# Patient Record
Sex: FEMALE | Race: WHITE | RURAL
[De-identification: names, ages, dates, MRNs, and addresses within clinical notes are randomized per-mention and may not be internally consistent; named-entity substitution may affect disease eponyms.]

---

## 2017-04-06 ENCOUNTER — TELEPHONE (OUTPATIENT)
Dept: FAMILY MEDICINE CLINIC | Age: 27
End: 2017-04-06

## 2017-04-06 NOTE — TELEPHONE ENCOUNTER
Patient was called and notified of Janice's response and was told that if things get really bad then maybe she can be seen in another office or go to ER.

## 2017-04-06 NOTE — TELEPHONE ENCOUNTER
SW patient and she stated that she was hoping to get in today. I told her that we have had some phone and computers issues today and was sorry of just getting back with her at 3 pm.    She has sensitive ears and has seen an ENT about 5 months ago for it. She has caught a cold sense then and has been having pain in both of her ears for about 2 weeks and  feel like this could be more of an ear infection. She has not taken anything, but has to go to work shortly and will call back in the morning to see if she can be worked in or advised. Message was sent to [facility] for review for any further advice.

## 2017-06-13 ENCOUNTER — OFFICE VISIT (OUTPATIENT)
Dept: INTERNAL MEDICINE CLINIC | Age: 27
End: 2017-06-13

## 2017-06-13 VITALS
OXYGEN SATURATION: 100 % | HEIGHT: 69 IN | BODY MASS INDEX: 24.73 KG/M2 | SYSTOLIC BLOOD PRESSURE: 139 MMHG | WEIGHT: 167 LBS | RESPIRATION RATE: 16 BRPM | HEART RATE: 101 BPM | TEMPERATURE: 98 F | DIASTOLIC BLOOD PRESSURE: 89 MMHG

## 2017-06-13 DIAGNOSIS — R63.5 WEIGHT GAIN: ICD-10-CM

## 2017-06-13 DIAGNOSIS — F39 MOOD DISORDER (HCC): Primary | ICD-10-CM

## 2017-06-13 NOTE — MR AVS SNAPSHOT
Visit Information Date & Time Provider Department Dept. Phone Encounter #  
 6/13/2017  8:15 AM Melanie Cifuentes MD [facility] Amendtala Jacobs [id number] Follow-up Instructions Return in about 3 months (around 9/13/2017) for routine follow up. Upcoming Health Maintenance Date Due DTaP/Tdap/Td series (1 - Tdap) 9/20/2013 PAP AKA CERVICAL CYTOLOGY 7/30/2016 INFLUENZA AGE 9 TO ADULT 8/1/2017 Allergies as of 6/13/2017  Review Complete On: 6/13/2017 By: Bridgette Galvan LPN Severity Noted Reaction Type Reactions Depakote [Divalproex]  06/13/2017    Swelling Current Immunizations  Never Reviewed Name Date HPV 10/20/2011, 6/30/2011, 5/2/2011 Not reviewed this visit You Were Diagnosed With   
  
 Codes Comments Mood disorder ([facility]    -  Primary ICD-10-CM: F39 
ICD-9-CM: 296.90 Weight gain     ICD-10-CM: R63.5 ICD-9-CM: 783.1 Vitals BP Pulse Temp Resp Height(growth percentile) Weight(growth percentile) 139/89 (BP 1 Location: Left arm, BP Patient Position: Sitting) (!) 101 98 °F (36.7 °C) (Oral) 16 5' 9\" (1.753 m) 167 lb (75.8 kg) LMP SpO2 BMI OB Status Smoking Status 05/13/2017 100% 24.66 kg/m2 Having regular periods Never Smoker BMI and BSA Data Body Mass Index Body Surface Area  
 24.66 kg/m 2 1.92 m 2 Preferred Pharmacy Pharmacy Name Phone [facility] [address] [place] [address], [address] [place] [address] Venkata Jacobs 101 Your Updated Medication List  
  
   
This list is accurate as of: 6/13/17  9:40 AM.  Always use your most recent med list.  
  
  
  
  
 norgestimate-ethinyl estradiol 0.25-35 mg-mcg Tab Commonly known as:  Nubia [place] Take 1 Tab by mouth daily. valACYclovir 1 gram tablet Commonly known as:  VALTREX Take 1 Tab by mouth daily. We Performed the Following CBC W/O DIFF [93764 CPT(R)] METABOLIC PANEL, COMPREHENSIVE [17350 CPT(R)] MS COLLECTION VENOUS BLOOD,VENIPUNCTURE [id number] CPT(R)] MS HANDLG&/OR CONVEY OF SPEC FOR TR OFFICE TO LAB [36557 CPT(R)] TSH 3RD GENERATION [65258 CPT(R)] Follow-up Instructions Return in about 3 months (around 9/13/2017) for routine follow up. Patient Instructions We discussed stratagies to reduce weight. Specifically discussed the Intemmitant Fasting diet, the [address] [place] [address] Avenue. Discussed weight loss programs as well as exercise, although emphasized caloric restriction. Consider keeping a food diary and seeing what you eat in a day and looking for where you can cut calories. Try taking a picture of everything you eat for a day. Phone apps can help witrh weight loss. Consider 'Lose It' You must reduce liquid calories like soda and juices. Weight Watchers or other weight loss programs can be very helpful. There is no \"magic pill', but there some newly FDA approved medications for obesity. Introducing [facility] SERVICES! Ant Payne introduces IBUonline patient portal. Now you can access parts of your medical record, email your doctor's office, and request medication refills online. 1. In your internet browser, go to [email or web address] 2. Click on the First Time User? Click Here link in the Sign In box. You will see the New Member Sign Up page. 3. Enter your IBUonline Access Code exactly as it appears below. You will not need to use this code after youve completed the sign-up process. If you do not sign up before the expiration date, you must request a new code. · IBUonline Access Code: [email or web address] Expires: 9/11/2017  8:23 AM 
 
4. Enter the last four digits of your Social Security Number (xxxx) and Date of Birth (mm/dd/yyyy) as indicated and click Submit. You will be taken to the next sign-up page. 5. Create a IBUonline ID.  This will be your IBUonline login ID and cannot be changed, so think of one that is secure and easy to remember. 6. Create a Schvey password. You can change your password at any time. 7. Enter your Password Reset Question and Answer. This can be used at a later time if you forget your password. 8. Enter your e-mail address. You will receive e-mail notification when new information is available in [address] 9. Click Sign Up. You can now view and download portions of your medical record. 10. Click the Download Summary menu link to download a portable copy of your medical information. If you have questions, please visit the Frequently Asked Questions section of the Schvey website. Remember, Schvey is NOT to be used for urgent needs. For medical emergencies, dial 911. Now available from your iPhone and Android! Please provide this summary of care documentation to your next provider. Your primary care clinician is listed as Ankur Ordoñez. If you have any questions after today's visit, please call [phone].

## 2017-06-13 NOTE — PATIENT INSTRUCTIONS
We discussed stratagies to reduce weight. Specifically discussed the Intemmitant Fasting diet, the [address]. Discussed weight loss programs as well as exercise, although emphasized caloric restriction. Consider keeping a food diary and seeing what you eat in a day and looking for where you can cut calories. Try taking a picture of everything you eat for a day. Phone apps can help witrh weight loss. Consider 'Lose It'  You must reduce liquid calories like soda and juices. Weight Watchers or other weight loss programs can be very helpful. There is no \"magic pill', but there some newly FDA approved medications for obesity.

## 2017-06-13 NOTE — PROGRESS NOTES
Chief Complaint   Patient presents with   [address]     new patient to this practice     I have reviewed the patient's medical history in detail and updated the computerized patient record. Health Maintenance reviewed. Do you have an [facility] [address] in place in the event that you have a healthcare crisis that could impact your decision making as it pertains to your health?no    Would you like information about the [facility] given. no

## 2017-06-13 NOTE — PROGRESS NOTES
PROGRESS NOTE        SUBJECTIVE:  Diagnosis/Chief Complaint: Establish Care (new patient to this practice)      Doing well with mood no, up and down, sleep mostly OK, has had ayesha? Doesn't seem severe, she mentions Adderall as a treatment option. Symptoms seeing therapist who Dx her with bipolar?, not simple depression  Suicidal: no  Side affects: yes - from depakote which was stopped x 2 mo, weight gain vision changes breast enlargement, suicidal thoughts  States taking medications per medicine list.no, stopped depakote, it was not helping. Patient Active Problem List    Diagnosis Date Noted    Mood disorder ([facility] 05/20/2016    Insomnia 09/22/2014     Current Outpatient Prescriptions   Medication Sig Dispense Refill    valACYclovir (VALTREX) 1 gram tablet Take 1 Tab by mouth daily. 90 Tab 1    norgestimate-ethinyl estradiol (ORTHO-CYCLEN) 0.25-35 mg-mcg per tablet Take 1 Tab by mouth daily.        Allergies   Allergen Reactions    Depakote [Divalproex] Swelling     Past Medical History:   Diagnosis Date    Depression     GERD (gastroesophageal reflux disease)     Headache     Thyroid disease      Past Surgical History:   Procedure Laterality Date    HX COLONOSCOPY       Family History   Problem Relation Age of Onset    Arrhythmia Mother      TACHYCARDIA    Heart Disease Maternal Grandmother     No Known Problems Father     No Known Problems Maternal Grandfather     Stroke Paternal Grandmother     Hypertension Paternal Grandmother     Stroke Paternal Grandfather     Hypertension Paternal Grandfather     Diabetes Neg Hx     Cancer Neg Hx      Social History   Substance Use Topics    Smoking status: Never Smoker    Smokeless tobacco: Never Used    Alcohol use No        OBJECTIVE:    .  Visit Vitals    /89 (BP 1 Location: Left arm, BP Patient Position: Sitting)    Pulse (!) 101    Temp 98 °F (36.7 °C) (Oral)    Resp 16    Ht 5' 9\" (1.753 m)    Wt 167 lb (75.8 kg)    LMP 05/13/2017    SpO2 100%    BMI 24.66 kg/m2     WDWN in NAD  Heart RRR, no:C/M/R  Lungs CTA No wheezes, rales or rhonchi  Abdo: soft no tenderness, rebound or guarding  Neurological exam[de-identified] 2-12 intact  Psychiatric: Normal mood, judgement    Reviewed: Medications, allergies, clinical lab test results and imaging results have been reviewed. Any abnormal findings have been addressed. ASSESSMENT:       ICD-10-CM ICD-9-CM    1. Mood disorder (HCC) F39 296.90 CBC W/O DIFF      METABOLIC PANEL, COMPREHENSIVE      TSH 3RD GENERATION      MT HANDLG&/OR CONVEY OF SPEC FOR TR OFFICE TO LAB      MT COLLECTION VENOUS BLOOD,VENIPUNCTURE   2. Weight gain R63.5 783.1        PLAN    Orders Placed This Encounter    CBC W/O DIFF    METABOLIC PANEL, COMPREHENSIVE    TSH 3RD GENERATION    MT HANDLG&/OR CONVEY OF SPEC FOR TR OFFICE TO LAB    MT COLLECTION VENOUS BLOOD,VENIPUNCTURE     Not exactly sure what her diagnosis is,? Bipolar, does not seem to be a simple depression. We will get more information and make a decision on what to do. Leave off Depakote. Discussed diet and ways to help her lose weight. Will do some labs to rule out other causes of her symptoms  See patient instructions, went over them personally with the patient. Emphasized compliance. Questions answered. Patient states that they understand the plan of action and will call if there are any issues or misunderstandings. About 25 minutes spent talking about her health issues at today's visit. Follow-up Disposition:  Return in about 3 months (around 9/13/2017) for routine follow up.

## 2017-06-13 NOTE — LETTER
6/19/2017 10:17 AM 
 
[facility] [address] [place] [address] Dear Sara Frausto: 
 
Please find your most recent results below. Resulted Orders CBC W/O DIFF Result Value Ref Range WBC 4.3 3.4 - 10.8 x10E3/uL  
 RBC 4.37 3.77 - 5.28 x10E6/uL HGB 12.6 11.1 - 15.9 g/dL HCT 39.6 34.0 - 46.6 % MCV 91 79 - 97 fL  
 MCH 28.8 26.6 - 33.0 pg  
 MCHC 31.8 31.5 - 35.7 g/dL  
 RDW 14.2 12.3 - 15.4 % PLATELET 672 110 - 871 x10E3/uL Narrative Performed at:  [address]  [id number] : Meir Henning MD, Phone:  [phone] METABOLIC PANEL, COMPREHENSIVE Result Value Ref Range Glucose 79 65 - 99 mg/dL BUN 9 6 - 20 mg/dL Creatinine 0.61 0.57 - 1.00 mg/dL GFR est non- >59 mL/min/1.73 GFR est  >59 mL/min/1.73  
 BUN/Creatinine ratio 15 9 - 23 Sodium 142 134 - 144 mmol/L Potassium 4.4 3.5 - 5.2 mmol/L Chloride 102 96 - 106 mmol/L  
 CO2 22 18 - 29 mmol/L Calcium 8.9 8.7 - 10.2 mg/dL Protein, total 7.4 6.0 - 8.5 g/dL Albumin 4.2 3.5 - 5.5 g/dL GLOBULIN, TOTAL 3.2 1.5 - 4.5 g/dL A-G Ratio 1.3 1.2 - 2.2 Bilirubin, total 0.6 0.0 - 1.2 mg/dL Alk. phosphatase 39 39 - 117 IU/L  
 AST (SGOT) 17 0 - 40 IU/L  
 ALT (SGPT) 14 0 - 32 IU/L Narrative Performed at:  [address]  [id number] : Meir Henning MD, Phone:  [phone] TSH 3RD GENERATION Result Value Ref Range TSH 1.720 0.450 - 4.500 uIU/mL Narrative Performed at:  [address]  [id number] : Meir Henning MD, Phone:  [phone] RECOMMENDATIONS: 
The results of your most recent labs are normal/ stable. Please follow up as scheduled. Please call me if you have any questions: [phone] Sincerely, Megan Domínguez MD

## 2017-06-14 LAB
ALBUMIN SERPL-MCNC: 4.2 G/DL (ref 3.5–5.5)
ALBUMIN/GLOB SERPL: 1.3 {RATIO} (ref 1.2–2.2)
ALP SERPL-CCNC: 39 IU/L (ref 39–117)
ALT SERPL-CCNC: 14 IU/L (ref 0–32)
AST SERPL-CCNC: 17 IU/L (ref 0–40)
BILIRUB SERPL-MCNC: 0.6 MG/DL (ref 0–1.2)
BUN SERPL-MCNC: 9 MG/DL (ref 6–20)
BUN/CREAT SERPL: 15 (ref 9–23)
CALCIUM SERPL-MCNC: 8.9 MG/DL (ref 8.7–10.2)
CHLORIDE SERPL-SCNC: 102 MMOL/L (ref 96–106)
CO2 SERPL-SCNC: 22 MMOL/L (ref 18–29)
CREAT SERPL-MCNC: 0.61 MG/DL (ref 0.57–1)
ERYTHROCYTE [DISTWIDTH] IN BLOOD BY AUTOMATED COUNT: 14.2 % (ref 12.3–15.4)
GLOBULIN SER CALC-MCNC: 3.2 G/DL (ref 1.5–4.5)
GLUCOSE SERPL-MCNC: 79 MG/DL (ref 65–99)
HCT VFR BLD AUTO: 39.6 % (ref 34–46.6)
HGB BLD-MCNC: 12.6 G/DL (ref 11.1–15.9)
MCH RBC QN AUTO: 28.8 PG (ref 26.6–33)
MCHC RBC AUTO-ENTMCNC: 31.8 G/DL (ref 31.5–35.7)
MCV RBC AUTO: 91 FL (ref 79–97)
PLATELET # BLD AUTO: 307 X10E3/UL (ref 150–379)
POTASSIUM SERPL-SCNC: 4.4 MMOL/L (ref 3.5–5.2)
PROT SERPL-MCNC: 7.4 G/DL (ref 6–8.5)
RBC # BLD AUTO: 4.37 X10E6/UL (ref 3.77–5.28)
SODIUM SERPL-SCNC: 142 MMOL/L (ref 134–144)
TSH SERPL DL<=0.005 MIU/L-ACNC: 1.72 UIU/ML (ref 0.45–4.5)
WBC # BLD AUTO: 4.3 X10E3/UL (ref 3.4–10.8)

## 2017-06-15 ENCOUNTER — TELEPHONE (OUTPATIENT)
Dept: INTERNAL MEDICINE CLINIC | Age: 27
End: 2017-06-15

## 2017-06-15 DIAGNOSIS — F34.81 DMDD (DISRUPTIVE MOOD DYSREGULATION DISORDER) (HCC): Primary | ICD-10-CM

## 2017-06-15 NOTE — TELEPHONE ENCOUNTER
Disruptive mood disorder per therapist/psych tegretol caused weight gain, suggested adderal.  We need her to see psych for further eval.  Referral done

## 2017-06-28 ENCOUNTER — TELEPHONE (OUTPATIENT)
Dept: FAMILY MEDICINE CLINIC | Age: 27
End: 2017-06-28

## 2017-06-28 NOTE — TELEPHONE ENCOUNTER
----- Message from Jie Mayen sent at 6/28/2017 10:54 AM EDT -----  Regarding: TINO Clements/Telephone  Pt requested an appt for this wk or next wk for meds renewal.  Best contact number [phone].

## 2017-06-28 NOTE — TELEPHONE ENCOUNTER
Offered patient an appointment but it was taken by [facility]. I told her I'd try to get her an appointment.

## 2017-06-30 ENCOUNTER — OFFICE VISIT (OUTPATIENT)
Dept: FAMILY MEDICINE CLINIC | Age: 27
End: 2017-06-30

## 2017-06-30 VITALS
TEMPERATURE: 98.8 F | HEART RATE: 89 BPM | BODY MASS INDEX: 24.97 KG/M2 | HEIGHT: 69 IN | OXYGEN SATURATION: 99 % | RESPIRATION RATE: 16 BRPM | WEIGHT: 168.6 LBS | SYSTOLIC BLOOD PRESSURE: 120 MMHG | DIASTOLIC BLOOD PRESSURE: 60 MMHG

## 2017-06-30 DIAGNOSIS — F34.81 DMDD (DISRUPTIVE MOOD DYSREGULATION DISORDER) (HCC): Primary | ICD-10-CM

## 2017-06-30 RX ORDER — ESCITALOPRAM OXALATE 5 MG/1
10 TABLET ORAL DAILY
Qty: 30 TAB | Refills: 3 | Status: SHIPPED | OUTPATIENT
Start: 2017-06-30

## 2017-06-30 NOTE — MR AVS SNAPSHOT
Visit Information Date & Time Provider Department Dept. Phone Encounter #  
 6/30/2017 11:00 AM Lilibeth Alvarado NP [address] [phone] [id number] Your Appointments 8/16/2017  9:15 AM  
ACUTE CARE with MD Sanjana Combs [address] [place] [address] [facility]t Note: f/u on mood disorder $0pb $0cp [facility]6/[facility]/17  
 [address] Marita Bills [address]  
[phone]  
  
   
 [address] Upcoming Health Maintenance Date Due DTaP/Tdap/Td series (1 - Tdap) 9/20/2013 PAP AKA CERVICAL CYTOLOGY 7/30/2016 INFLUENZA AGE 9 TO ADULT 8/1/2017 Allergies as of 6/30/2017  Review Complete On: 6/30/2017 By: John Duncan LPN Severity Noted Reaction Type Reactions Depakote [Divalproex]  06/13/2017    Swelling Current Immunizations  Never Reviewed Name Date HPV 10/20/2011, 6/30/2011, 5/2/2011 Not reviewed this visit Vitals BP Pulse Temp Resp Height(growth percentile) Weight(growth percentile) 120/60 89 98.8 °F (37.1 °C) (Oral) 16 5' 9\" (1.753 m) 168 lb 9.6 oz (76.5 kg) LMP SpO2 BMI OB Status Smoking Status 05/13/2017 99% 24.9 kg/m2 Having regular periods Never Smoker BMI and BSA Data Body Mass Index Body Surface Area 24.9 kg/m 2 1.93 m 2 Preferred Pharmacy Pharmacy Name Phone [facility] [address] [facility], [facility] Venkata Jacobs 101 Your Updated Medication List  
  
   
This list is accurate as of: 6/30/17 12:19 PM.  Always use your most recent med list.  
  
  
  
  
 escitalopram oxalate 5 mg tablet Commonly known as:  Vallorie Primmer Take 2 Tabs by mouth daily. Indications: mood disorder  
  
 norgestimate-ethinyl estradiol 0.25-35 mg-mcg Tab Commonly known as:  Cy Conch Take 1 Tab by mouth daily. valACYclovir 1 gram tablet Commonly known as:  VALTREX Take 1 Tab by mouth daily. Prescriptions Sent to Pharmacy Refills  
 escitalopram oxalate (LEXAPRO) 5 mg tablet 3 Sig: Take 2 Tabs by mouth daily. Indications: mood disorder Class: Normal  
 Pharmacy: [id number] Landon Fox  #: [phone] Route: Oral  
  
Introducing [facility] SERVICES! [facility] introduces Motion Displays patient portal. Now you can access parts of your medical record, email your doctor's office, and request medication refills online. 1. In your internet browser, go to [email or web address] 2. Click on the First Time User? Click Here link in the Sign In box. You will see the New Member Sign Up page. 3. Enter your Motion Displays Access Code exactly as it appears below. You will not need to use this code after youve completed the sign-up process. If you do not sign up before the expiration date, you must request a new code. · Motion Displays Access Code: [email or web address] Expires: 9/11/2017  8:23 AM 
 
4. Enter the last four digits of your Social Security Number (xxxx) and Date of Birth (mm/dd/yyyy) as indicated and click Submit. You will be taken to the next sign-up page. 5. Create a Motion Displays ID. This will be your Motion Displays login ID and cannot be changed, so think of one that is secure and easy to remember. 6. Create a Motion Displays password. You can change your password at any time. 7. Enter your Password Reset Question and Answer. This can be used at a later time if you forget your password. 8. Enter your e-mail address. You will receive e-mail notification when new information is available in [address] 9. Click Sign Up. You can now view and download portions of your medical record. 10. Click the Download Summary menu link to download a portable copy of your medical information.  
 
If you have questions, please visit the Frequently Asked Questions section of the MediaSite. Remember, Think Good Thoughtshart is NOT to be used for urgent needs. For medical emergencies, dial 911. Now available from your iPhone and Android! Please provide this summary of care documentation to your next provider. Your primary care clinician is listed as Ann Lozano. If you have any questions after today's visit, please call [phone].

## 2017-06-30 NOTE — TELEPHONE ENCOUNTER
Patient would like to be seen for depression. Her \"mood\" medication was changed by Dr. Rosi Garcia at [address] [facility] but she says she has side effects from it. She wants a different medication.

## 2017-07-01 NOTE — PROGRESS NOTES
Subjective:     Shane Valenzuela is a 27 y.o. female who presents today with the following:  Chief Complaint   Patient presents with    Depression     and forest to be a different Nemiah Back was dx with DMDD. Was on depakote which work extremely well but caused multiple side effect including weight gain and insomnia. On no  medication at this time. Having difficulty managing relationships with boyfriend, family and friends. Would like to try medication and continue counseling. Has been r lexapro in the past but did not  the prescription. Willing to try lexapro . ROS:  Gen: denies fever, chills, fatigue, weight loss, weight gain  HEENT:denies blurry vision, nasal congestion, sore throat  Resp: denies dypsnea, cough, wheezing  CV: denies chest pain radiating to the jaws or arms, palpitations, lower extremity edema  Abd: denies nausea, vomiting, diarrhea, constipation  Neuro: denies numbness/tingling  Endo: denies polyuria, polydipsia, heat/cold intolerance  Heme: no lymphadenopathy    Allergies   Allergen Reactions    Depakote [Divalproex] Swelling         Current Outpatient Prescriptions:     escitalopram oxalate (LEXAPRO) 5 mg tablet, Take 2 Tabs by mouth daily. Indications: mood disorder, Disp: 30 Tab, Rfl: 3    valACYclovir (VALTREX) 1 gram tablet, Take 1 Tab by mouth daily. , Disp: 90 Tab, Rfl: 1    norgestimate-ethinyl estradiol (ORTHO-CYCLEN) 0.25-35 mg-mcg per tablet, Take 1 Tab by mouth daily. , Disp: , Rfl:     Past Medical History:   Diagnosis Date    Depression     GERD (gastroesophageal reflux disease)     Headache     Thyroid disease        Past Surgical History:   Procedure Laterality Date    HX COLONOSCOPY         History   Smoking Status    Never Smoker   Smokeless Tobacco    Never Used       Social History     Social History    Marital status:      Spouse name: N/A    Number of children: 0    Years of education: N/A     Occupational History    cook Social History Main Topics    Smoking status: Never Smoker    Smokeless tobacco: Never Used    Alcohol use No    Drug use: No    Sexual activity: Yes     Other Topics Concern    None     Social History Narrative    Lives with BF       Family History   Problem Relation Age of Onset    Arrhythmia Mother      TACHYCARDIA    Heart Disease Maternal Grandmother     No Known Problems Father     No Known Problems Maternal Grandfather     Stroke Paternal Grandmother     Hypertension Paternal Grandmother     Stroke Paternal Grandfather     Hypertension Paternal Grandfather     Diabetes Neg Hx     Cancer Neg Hx          Objective:     Visit Vitals    /60    Pulse 89    Temp 98.8 °F (37.1 °C) (Oral)    Resp 16    Ht 5' 9\" (1.753 m)    Wt 168 lb 9.6 oz (76.5 kg)    LMP 05/13/2017    SpO2 99%    BMI 24.9 kg/m2     Body mass index is 24.9 kg/(m^2). General: Alert and oriented. No acute distress. Well nourished  HEENT :  Ears:TMs are normal. Canals are clear. Eyes: pupils equal, round, react to light and accommodation. Extra ocular movements intact. Nose: patent. Mouth and throat is clear. Neck:supple full range of motion no thyromegaly. Trachea midline, No carotid bruits. No significant lymphadenopathy  Lungs[de-identified] clear to auscultation without wheezes, rales, or rhonchi. Heart :RRR, S1 & S2 are normal intensity. No murmur; no gallop          No results found for this visit on 06/30/17. Assessment/ Plan:     [facility] was seen today for depression. Diagnoses and all orders for this visit:    DMDD (disruptive mood dysregulation disorder) ([facility] [address] [address]    Other orders  -     escitalopram oxalate (LEXAPRO) 5 mg tablet; Take 2 Tabs by mouth daily. Indications: mood disorder         1. DMDD (disruptive mood dysregulation disorder) ([address]        Orders Placed This Encounter    escitalopram oxalate (LEXAPRO) 5 mg tablet     Sig: Take 2 Tabs by mouth daily.  Indications: mood disorder     Dispense:  30 Tab     Refill:  3     RTO in 1month for medication review    Verbal and written instructions (see AVS) provided.  Patient expresses understanding of diagnosis and treatment plan.     JEAN MARIE Diggs-C